# Patient Record
Sex: MALE | Race: WHITE | URBAN - METROPOLITAN AREA
[De-identification: names, ages, dates, MRNs, and addresses within clinical notes are randomized per-mention and may not be internally consistent; named-entity substitution may affect disease eponyms.]

---

## 2022-04-28 ENCOUNTER — APPOINTMENT (RX ONLY)
Dept: URBAN - METROPOLITAN AREA CLINIC 81 | Facility: CLINIC | Age: 47
Setting detail: DERMATOLOGY
End: 2022-04-28

## 2022-04-28 DIAGNOSIS — L20.89 OTHER ATOPIC DERMATITIS: ICD-10-CM

## 2022-04-28 PROCEDURE — ? PRESCRIPTION MEDICATION MANAGEMENT

## 2022-04-28 PROCEDURE — ? KOH PREP

## 2022-04-28 PROCEDURE — ? DIAGNOSIS COMMENT

## 2022-04-28 PROCEDURE — ? COUNSELING

## 2022-04-28 PROCEDURE — 99203 OFFICE O/P NEW LOW 30 MIN: CPT

## 2022-04-28 ASSESSMENT — LOCATION DETAILED DESCRIPTION DERM
LOCATION DETAILED: LEFT MEDIAL INFERIOR CHEST
LOCATION DETAILED: LEFT VENTRAL PROXIMAL FOREARM
LOCATION DETAILED: RIGHT VENTRAL PROXIMAL FOREARM
LOCATION DETAILED: RIGHT LATERAL ABDOMEN

## 2022-04-28 ASSESSMENT — LOCATION SIMPLE DESCRIPTION DERM
LOCATION SIMPLE: LEFT FOREARM
LOCATION SIMPLE: CHEST
LOCATION SIMPLE: RIGHT FOREARM
LOCATION SIMPLE: ABDOMEN

## 2022-04-28 ASSESSMENT — LOCATION ZONE DERM
LOCATION ZONE: ARM
LOCATION ZONE: TRUNK

## 2022-04-28 NOTE — HPI: RASH
What Type Of Note Output Would You Prefer (Optional)?: Bullet Format
How Severe Is Your Rash?: moderate
Is This A New Presentation, Or A Follow-Up?: Rash
Additional History: Patient states she has had what he believes to be eczema. Rashes come and go. Have been occurring since 2012. Rashes are red and itchy. Currently rash isn’t that bad but reports that it was really bad not that long ago. States he went to ER about a month ago and was given prednisone. States he returned to ER about 2 weeks ago and was given permethrin.\\n\\nPatient reports: in 2012 he had a bad poison ivy rash while in [place]. Started getting reoccurring rashes since then. Believes rashes to be eczema and believes it waxes and wanes with pollen levels. States he spent a few weeks really miserable recently. Believes he had a UV exacerbation about 2 weeks ago.\\n\\nPatient reports no history of asthma or allergies. Reports he might have had a skin sensitivity to adhesives in past. Reports no other skin sensitivities that he is aware of.

## 2022-04-28 NOTE — PROCEDURE: DIAGNOSIS COMMENT
Detail Level: Simple
Render Risk Assessment In Note?: no
Comment: He will let us know if he has recurrent UV sensitivity after brief 5 to 10 min outside exposure.    DDx could include PMLE, or photosensitive eczema, or an autoimmune skin disease.

## 2022-04-28 NOTE — PROCEDURE: PRESCRIPTION MEDICATION MANAGEMENT
Samples Given: Cerave moisturizer
Plan: Gentle skin care.\\nMoisturize regularly (recommend ceramide based moisturizers).\\nGentle cleansers and moisturizers.\\nAvoid fragrance, hot water, etc…\\n\\nL-Histadine supplements 4g daily.\\n\\nPatient will try going out in sun midday without shirt on to see if he gets rash - will let us know if he does get photo-induced rashes.\\n\\nDiscussed topical steroid treatment - patient defers at this time. Will call in betamethasone augmented if patient needs it.\\nPatch testing discussed - will defer at this time.
Detail Level: Zone
Render In Strict Bullet Format?: No